# Patient Record
Sex: FEMALE | Race: WHITE | NOT HISPANIC OR LATINO | URBAN - METROPOLITAN AREA
[De-identification: names, ages, dates, MRNs, and addresses within clinical notes are randomized per-mention and may not be internally consistent; named-entity substitution may affect disease eponyms.]

---

## 2017-01-20 ENCOUNTER — APPOINTMENT (OUTPATIENT)
Dept: URBAN - METROPOLITAN AREA CLINIC 200 | Age: 57
Setting detail: DERMATOLOGY
End: 2017-01-24

## 2017-01-20 DIAGNOSIS — L82.0 INFLAMED SEBORRHEIC KERATOSIS: ICD-10-CM

## 2017-01-20 PROCEDURE — OTHER LIQUID NITROGEN (COSMETIC): OTHER

## 2017-01-20 ASSESSMENT — LOCATION ZONE DERM: LOCATION ZONE: TRUNK

## 2017-01-20 ASSESSMENT — LOCATION DETAILED DESCRIPTION DERM: LOCATION DETAILED: INFERIOR THORACIC SPINE

## 2017-01-20 ASSESSMENT — LOCATION SIMPLE DESCRIPTION DERM: LOCATION SIMPLE: UPPER BACK

## 2017-01-20 NOTE — PROCEDURE: LIQUID NITROGEN (COSMETIC)
Price (Use Numbers Only, No Special Characters Or $): 25
Detail Level: Zone
Consent: The patient's consent was obtained including but not limited to risks of crusting, scabbing, blistering, scarring, darker or lighter pigmentary change, recurrence, incomplete removal and infection.
Post-Care Instructions: I reviewed with the patient in detail post-care instructions. Patient is to wear sunprotection, and avoid picking at any of the treated lesions. Pt may apply Vaseline to crusted or scabbing areas.
Number Of Freeze-Thaw Cycles: 2 freeze-thaw cycles
Duration Of Freeze Thaw-Cycle (Seconds): 0
Total Number Of Lesions Treated: 6

## 2018-06-25 ENCOUNTER — APPOINTMENT (OUTPATIENT)
Dept: URBAN - METROPOLITAN AREA CLINIC 200 | Age: 58
Setting detail: DERMATOLOGY
End: 2018-06-25

## 2018-08-16 ENCOUNTER — APPOINTMENT (OUTPATIENT)
Dept: URBAN - METROPOLITAN AREA CLINIC 200 | Age: 58
Setting detail: DERMATOLOGY
End: 2018-08-28

## 2018-08-16 DIAGNOSIS — L82.0 INFLAMED SEBORRHEIC KERATOSIS: ICD-10-CM

## 2018-08-16 PROBLEM — L20.84 INTRINSIC (ALLERGIC) ECZEMA: Status: ACTIVE | Noted: 2018-08-16

## 2018-08-16 PROCEDURE — 99212 OFFICE O/P EST SF 10 MIN: CPT

## 2018-08-16 PROCEDURE — OTHER REASSURANCE: OTHER

## 2018-08-16 ASSESSMENT — LOCATION DETAILED DESCRIPTION DERM: LOCATION DETAILED: LEFT RIB CAGE

## 2018-08-16 ASSESSMENT — LOCATION SIMPLE DESCRIPTION DERM: LOCATION SIMPLE: ABDOMEN

## 2018-08-16 ASSESSMENT — LOCATION ZONE DERM: LOCATION ZONE: TRUNK

## 2025-01-01 ENCOUNTER — HOSPITAL ENCOUNTER (OUTPATIENT)
Facility: CLINIC | Age: 65
Discharge: HOME | End: 2025-01-01
Attending: FAMILY MEDICINE
Payer: COMMERCIAL

## 2025-01-01 VITALS
SYSTOLIC BLOOD PRESSURE: 118 MMHG | DIASTOLIC BLOOD PRESSURE: 78 MMHG | HEART RATE: 74 BPM | OXYGEN SATURATION: 99 % | TEMPERATURE: 98.1 F

## 2025-01-01 DIAGNOSIS — L03.213 PRESEPTAL CELLULITIS OF LEFT EYE: Primary | ICD-10-CM

## 2025-01-01 PROCEDURE — S9083 URGENT CARE CENTER GLOBAL: HCPCS | Performed by: FAMILY MEDICINE

## 2025-01-01 PROCEDURE — 99203 OFFICE O/P NEW LOW 30 MIN: CPT | Performed by: FAMILY MEDICINE

## 2025-01-01 RX ORDER — SIMVASTATIN 10 MG/1
10 TABLET, FILM COATED ORAL
COMMUNITY

## 2025-01-01 RX ORDER — LORAZEPAM 0.5 MG/1
TABLET ORAL
COMMUNITY
Start: 2024-10-16

## 2025-01-01 RX ORDER — LEVOTHYROXINE SODIUM 75 UG/1
75 TABLET ORAL
COMMUNITY

## 2025-01-01 RX ORDER — VIT C/E/ZN/COPPR/LUTEIN/ZEAXAN 250MG-90MG
CAPSULE ORAL
COMMUNITY
Start: 2024-08-01

## 2025-01-01 RX ORDER — AMOXICILLIN AND CLAVULANATE POTASSIUM 875; 125 MG/1; MG/1
1 TABLET, FILM COATED ORAL
Qty: 14 TABLET | Refills: 0 | Status: SHIPPED | OUTPATIENT
Start: 2025-01-01 | End: 2025-01-08

## 2025-01-01 RX ORDER — SUMATRIPTAN SUCCINATE 100 MG/1
TABLET ORAL
COMMUNITY

## 2025-01-01 ASSESSMENT — VISUAL ACUITY: OU: 1

## 2025-01-01 NOTE — ED PROVIDER NOTES
History  Chief Complaint   Patient presents with    Eye Problem     Lt eye swelling and pain started yesterday.      HPI  #L eye swelling and pain  Started 1 day ago  No trouble w/ vision  Doesn't wear contacts  No hx of trauma to area  Some eye discharge noted  No recent uri sxs    No past medical history on file.    No past surgical history on file.    No family history on file.         Review of Systems  As above    Physical Exam  ED Triage Vitals [01/01/25 1344]   Temp Heart Rate Resp BP SpO2   36.7 °C (98.1 °F) 74 -- 118/78 99 %      Temp src Heart Rate Source Patient Position BP Location FiO2 (%) (Set)   -- Monitor -- -- --       Physical Exam  Constitutional:       General: She is not in acute distress.     Appearance: She is not toxic-appearing.   HENT:      Head: Normocephalic and atraumatic.      Nose: Nose normal.      Mouth/Throat:      Mouth: Mucous membranes are moist.   Eyes:      General: Lids are everted, no foreign bodies appreciated. Vision grossly intact.         Right eye: No discharge.         Left eye: No discharge.      Extraocular Movements: Extraocular movements intact.      Pupils: Pupils are equal, round, and reactive to light.        Comments: L eyelid swelling, warm and erythematous   Neurological:      Mental Status: She is alert.           Procedures  Procedures    UC Course       Medical Decision Making  L preseptal cellulitis  No orbital involvement or proptosis  Will tx w/ Augmentin BID x 10 days  ED precautions d/w patient-- if any issues w/ vision/loss of vision or no improvement in 48-72 hours to go to ED for further Jessica Rinaldi, DO  01/01/25 3806

## 2025-07-08 ENCOUNTER — APPOINTMENT (OUTPATIENT)
Dept: RADIOLOGY | Age: 65
End: 2025-07-08
Payer: COMMERCIAL

## 2025-07-08 ENCOUNTER — HOSPITAL ENCOUNTER (OUTPATIENT)
Facility: CLINIC | Age: 65
Discharge: HOME | End: 2025-07-08
Attending: FAMILY MEDICINE
Payer: COMMERCIAL

## 2025-07-08 VITALS
HEART RATE: 88 BPM | SYSTOLIC BLOOD PRESSURE: 140 MMHG | TEMPERATURE: 98.1 F | DIASTOLIC BLOOD PRESSURE: 100 MMHG | OXYGEN SATURATION: 98 %

## 2025-07-08 DIAGNOSIS — S93.139A: Primary | ICD-10-CM

## 2025-07-08 PROCEDURE — 99214 OFFICE O/P EST MOD 30 MIN: CPT

## 2025-07-08 PROCEDURE — S9083 URGENT CARE CENTER GLOBAL: HCPCS

## 2025-07-08 PROCEDURE — 73660 X-RAY EXAM OF TOE(S): CPT | Mod: RT,76

## 2025-07-08 ASSESSMENT — ENCOUNTER SYMPTOMS
NECK STIFFNESS: 0
CARDIOVASCULAR NEGATIVE: 1
WEAKNESS: 0
DIZZINESS: 0
COLOR CHANGE: 1
EYES NEGATIVE: 1
CONSTITUTIONAL NEGATIVE: 1
JOINT SWELLING: 0
MYALGIAS: 0
ACTIVITY CHANGE: 0
NECK PAIN: 0
NUMBNESS: 0
LIGHT-HEADEDNESS: 0
RESPIRATORY NEGATIVE: 1
GASTROINTESTINAL NEGATIVE: 1
BRUISES/BLEEDS EASILY: 0
HEADACHES: 0
BACK PAIN: 0
ARTHRALGIAS: 1
WOUND: 0

## 2025-07-08 NOTE — DISCHARGE INSTRUCTIONS
"Ice the injured area to help reduce pain and swelling. Wrap a cold source (recommending a bag of frozen peas or frozen corn) in a thin towel. Apply to the injured area for 20 minutes every 1 to 2 hours the first day. Continue this 3 to 4 times a day until the pain and swelling goes away.     Proper Icing Technique  - Get the ice on quickly: Icing is most effective immediately following an injury. This helps reduce swelling and inflammation.  - Perform an \"ice massage:\" When applying ice, move the ice around, not allowing it to sit in one spot. This prevents your skin from experiencing frostbite, which can cause swelling and blisters.  - Don't forget to elevate: If possible, keep the injured body part raised above the heart while icing. This may help reduce swelling.  - Watch the clock: Ice for no longer than 10-20 minutes at a time. Allow time between treatments: Allow the area to warm for at least 30 minutes before you ice again. Apply ice again if you continue to have swelling.  - Repeat as desired: Ice as often as you need, as long as the area is warm to the touch and has normal sensation before repeating    Remember,  Ice is Nice, that's why they rhyme, and you can use it ALL the time : )     __________________________________________________________    **Avoid heat if possible. If anything, apply moist heat (ie- shower, warm wet washcloth compresses), this is preferred over dry heat (ie-heating pad).** Moist heat is recommended prior to activity/strengthening/stretching. Ice immediately after .    You can also try using lidocaine patches, arnica gel, tiger balm, bengay, mineral ice, etc for topical pain relief.   __________________________________________________________    For pain:    You may alternate Ibuprofen with Tylenol every 3-4 hours. (Ex: Ibuprofen at 8am, Tylenol at 11am, Ibuprofen at 2pm, etc) as needed.   Ibuprofen 600mg every 6hr, Always with food   (Max daily dose not to exceed 2400mg!)  Tylenol " 500mg doses every 4hrs OR 650mg doses every 6hrs (Max daily dose not to exceed 3000mg!)  __________________________________________________________     If symptoms persist or worsen, consider following up with Primary Care Provider and/or an Orthopedic specialist.    __________________________________________________________    Please let us know about your experience today!   Your input is truly appreciated and helps MARCO Ann and your team at [facility] to continue to provide a superior level of service!     Get Well Soon!

## 2025-07-08 NOTE — ED PROVIDER NOTES
Emergency Medicine Note  HPI   HISTORY OF PRESENT ILLNESS     Pt jammed her right little toe on a door this morning.  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  Toe is tender and bruised.               Patient History   PAST HISTORY     Reviewed from Nursing Triage:       No past medical history on file.    No past surgical history on file.    No family history on file.           Review of Systems   REVIEW OF SYSTEMS     Review of Systems   Constitutional: Negative.  Negative for activity change.   HENT: Negative.     Eyes: Negative.    Respiratory: Negative.     Cardiovascular: Negative.    Gastrointestinal: Negative.    Musculoskeletal:  Positive for arthralgias and gait problem. Negative for back pain, joint swelling, myalgias, neck pain and neck stiffness.   Skin:  Positive for color change. Negative for rash and wound.   Neurological:  Negative for dizziness, weakness, light-headedness, numbness and headaches.   Hematological:  Does not bruise/bleed easily.   All other systems reviewed and are negative.        VITALS     ED Vitals      Date/Time Temp Pulse Resp BP SpO2 [facility]   07/08/25 1556 36.7 °C (98.1 °F) 88 -- 140/100 98 % [facility]                         Physical Exam   PHYSICAL EXAM     Physical Exam  Vitals and nursing note reviewed.   Constitutional:       General: She is not in acute distress.     Appearance: Normal appearance. She is not ill-appearing.   HENT:      Head: Normocephalic and atraumatic.      Mouth/Throat:      Pharynx: Oropharynx is clear.   Cardiovascular:      Rate and Rhythm: Normal rate.      Pulses: Normal pulses.           Dorsalis pedis pulses are 2+ on the right side.        Posterior tibial pulses are 2+ on the right side.   Pulmonary:      Effort: Pulmonary effort is normal. No respiratory distress.   Musculoskeletal:         General: Tenderness present. No swelling. Normal range of motion.      Cervical back:  Normal range of motion.      Right lower leg: No edema.      Left lower leg: No edema.      Right foot: Normal range of motion and normal capillary refill. Deformity, tenderness and bony tenderness present. No swelling or crepitus. Normal pulse.      Left foot: Normal.        Feet:    Feet:      Right foot:      Skin integrity: No skin breakdown, erythema, warmth or dry skin.      Toenail Condition: Right toenails are normal.      Comments: +ttp, no swelling/bruising/erythema, FROM, +2 pulses, brisk cap refill, normal sensation/motor, 5/5 strength, laceration/bony step off/crepitus, lateral deviation of the toe  Skin:     General: Skin is warm and dry.      Capillary Refill: Capillary refill takes less than 2 seconds.      Findings: Bruising present. No erythema or rash.   Neurological:      Mental Status: She is alert and oriented to person, place, and time.      Sensory: No sensory deficit.      Motor: No weakness.      Coordination: Coordination normal.      Gait: Gait normal.           PROCEDURES     Orthopedic Injury Treatment - Fracture Dislocation    Date/Time: 7/8/2025 5:35 PM    Performed by: Marissa Soares FNP  Authorized by: Vinicio Le DO    Consent:     Consent obtained:  Verbal    Consent given by:  Patient    Risks, benefits, and alternatives were discussed: yes      Risks discussed:  Nerve damage, pain and vascular damage (pt notified of possible avulsion fx caused by reduction)    Alternatives discussed:  No treatment, delayed treatment, alternative treatment and referral  Universal protocol:     Procedure explained and questions answered to patient or proxy's satisfaction: yes      Patient identity confirmed:  Verbally with patient  Injury:     Injury location:  Toe    Toe injury location:  R fifth toe    Toe fracture type: proximal phalanx    Pre-procedure details:     Distal neurologic exam:  Numbness (chronic neuropathy)    Distal perfusion: distal pulses strong and brisk capillary refill       Range of motion: reduced    Sedation:     Sedation type:  None  Anesthesia:     Anesthesia method:  None  Procedure details:     Manipulation performed: yes      Skin traction used: yes      Reduction successful: yes      X-ray confirmed reduction: yes      Immobilization:  Tape (post op shoe)    Supplies used:  Elastic bandage    Additional details:  Postop shoe  Post-procedure details:     Distal neurologic exam:  Normal    Distal perfusion: distal pulses strong and brisk capillary refill      Range of motion: improved      Procedure completion:  Tolerated well, no immediate complications    Fracture management: I provided definitive fracture management         DATA     Results       None                No orders to display       Scoring tools                                  ED Course & MDM   MDM / ED COURSE / CLINICAL IMPRESSION / DISPO     Medical Decision Making  Patient with asymptomatic elevation in blood pressure. Informed patient that BP is above goal  Recc patient to follow up with PCP within 1 week for BP check.   Xray of right little toe- There is lateral subluxation at the interphalangeal joint of the fifth digit without a definite fracture.  Toe reduced and re-xray'ed- Satisfactory reduction of fifth PIP dislocation.  A fracture involving the distal aspect of the proximal phalanx extending to the PIP is seen to better advantage on the current study.  Pt did not want to wait for xray results after reduction, toe taped and post op shoe given.   Recommending RICE & NSAIDs  Recommending PCP/Orthopedic specialist if symptoms persist/worsen.   Patient given opportunity to ask questions and all were answered.   Patient verbalized understanding and agreeable with plan.              Clinical Impression      Closed traumatic subluxation of single interphalangeal joint of toe, initial encounter     _________________       ED Disposition   Discharge                       Marissa Soares FNP  07/09/25 0878        Marissa Soares, [facility]  07/09/25 0838

## 2025-07-09 NOTE — ED ATTESTATION NOTE
I was immediately available to provide supervision and direction for the care of the patient.    The patient was evaluated and managed by the nurse practitioner.       Vinicio Le DO  07/09/25 0908